# Patient Record
Sex: MALE | NOT HISPANIC OR LATINO | URBAN - METROPOLITAN AREA
[De-identification: names, ages, dates, MRNs, and addresses within clinical notes are randomized per-mention and may not be internally consistent; named-entity substitution may affect disease eponyms.]

---

## 2017-07-09 ENCOUNTER — EMERGENCY (EMERGENCY)
Facility: HOSPITAL | Age: 52
LOS: 1 days | Discharge: HOME | End: 2017-07-09
Attending: EMERGENCY MEDICINE | Admitting: EMERGENCY MEDICINE
Payer: COMMERCIAL

## 2017-07-09 VITALS
DIASTOLIC BLOOD PRESSURE: 73 MMHG | SYSTOLIC BLOOD PRESSURE: 112 MMHG | HEART RATE: 121 BPM | RESPIRATION RATE: 16 BRPM | OXYGEN SATURATION: 95 % | HEIGHT: 73 IN | TEMPERATURE: 99 F | WEIGHT: 164.91 LBS

## 2017-07-09 VITALS
HEART RATE: 89 BPM | SYSTOLIC BLOOD PRESSURE: 100 MMHG | OXYGEN SATURATION: 94 % | DIASTOLIC BLOOD PRESSURE: 66 MMHG | RESPIRATION RATE: 16 BRPM

## 2017-07-09 DIAGNOSIS — R41.82 ALTERED MENTAL STATUS, UNSPECIFIED: ICD-10-CM

## 2017-07-09 DIAGNOSIS — F10.129 ALCOHOL ABUSE WITH INTOXICATION, UNSPECIFIED: ICD-10-CM

## 2017-07-09 DIAGNOSIS — R00.0 TACHYCARDIA, UNSPECIFIED: ICD-10-CM

## 2017-07-09 LAB
ALBUMIN SERPL ELPH-MCNC: 3.9 G/DL — SIGNIFICANT CHANGE UP (ref 3.3–5)
ALP SERPL-CCNC: 71 U/L — SIGNIFICANT CHANGE UP (ref 40–120)
ALT FLD-CCNC: 19 U/L — SIGNIFICANT CHANGE UP (ref 10–45)
ANION GAP SERPL CALC-SCNC: 22 MMOL/L — HIGH (ref 5–17)
AST SERPL-CCNC: 31 U/L — SIGNIFICANT CHANGE UP (ref 10–40)
BASOPHILS NFR BLD AUTO: 0.4 % — SIGNIFICANT CHANGE UP (ref 0–2)
BILIRUB SERPL-MCNC: 0.6 MG/DL — SIGNIFICANT CHANGE UP (ref 0.2–1.2)
BUN SERPL-MCNC: 12 MG/DL — SIGNIFICANT CHANGE UP (ref 7–23)
CALCIUM SERPL-MCNC: 8.4 MG/DL — SIGNIFICANT CHANGE UP (ref 8.4–10.5)
CHLORIDE SERPL-SCNC: 97 MMOL/L — SIGNIFICANT CHANGE UP (ref 96–108)
CO2 SERPL-SCNC: 24 MMOL/L — SIGNIFICANT CHANGE UP (ref 22–31)
CREAT SERPL-MCNC: 0.7 MG/DL — SIGNIFICANT CHANGE UP (ref 0.5–1.3)
EOSINOPHIL NFR BLD AUTO: 1.7 % — SIGNIFICANT CHANGE UP (ref 0–6)
ETHANOL SERPL-MCNC: 475 MG/DL — HIGH (ref 0–10)
GLUCOSE SERPL-MCNC: 91 MG/DL — SIGNIFICANT CHANGE UP (ref 70–99)
HCT VFR BLD CALC: 38.6 % — LOW (ref 39–50)
HGB BLD-MCNC: 13.4 G/DL — SIGNIFICANT CHANGE UP (ref 13–17)
LYMPHOCYTES # BLD AUTO: 25.9 % — SIGNIFICANT CHANGE UP (ref 13–44)
MAGNESIUM SERPL-MCNC: 1.8 MG/DL — SIGNIFICANT CHANGE UP (ref 1.6–2.6)
MCHC RBC-ENTMCNC: 32.6 PG — SIGNIFICANT CHANGE UP (ref 27–34)
MCHC RBC-ENTMCNC: 34.7 G/DL — SIGNIFICANT CHANGE UP (ref 32–36)
MCV RBC AUTO: 93.9 FL — SIGNIFICANT CHANGE UP (ref 80–100)
MONOCYTES NFR BLD AUTO: 5.3 % — SIGNIFICANT CHANGE UP (ref 2–14)
NEUTROPHILS NFR BLD AUTO: 66.7 % — SIGNIFICANT CHANGE UP (ref 43–77)
PHOSPHATE SERPL-MCNC: 3.5 MG/DL — SIGNIFICANT CHANGE UP (ref 2.5–4.5)
PLATELET # BLD AUTO: 129 K/UL — LOW (ref 150–400)
POTASSIUM SERPL-MCNC: 4.1 MMOL/L — SIGNIFICANT CHANGE UP (ref 3.5–5.3)
POTASSIUM SERPL-SCNC: 4.1 MMOL/L — SIGNIFICANT CHANGE UP (ref 3.5–5.3)
PROT SERPL-MCNC: 6.7 G/DL — SIGNIFICANT CHANGE UP (ref 6–8.3)
RBC # BLD: 4.11 M/UL — LOW (ref 4.2–5.8)
RBC # FLD: 14.3 % — SIGNIFICANT CHANGE UP (ref 10.3–16.9)
SODIUM SERPL-SCNC: 143 MMOL/L — SIGNIFICANT CHANGE UP (ref 135–145)
WBC # BLD: 9.4 K/UL — SIGNIFICANT CHANGE UP (ref 3.8–10.5)
WBC # FLD AUTO: 9.4 K/UL — SIGNIFICANT CHANGE UP (ref 3.8–10.5)

## 2017-07-09 PROCEDURE — 84100 ASSAY OF PHOSPHORUS: CPT

## 2017-07-09 PROCEDURE — 85025 COMPLETE CBC W/AUTO DIFF WBC: CPT

## 2017-07-09 PROCEDURE — 93005 ELECTROCARDIOGRAM TRACING: CPT

## 2017-07-09 PROCEDURE — 80053 COMPREHEN METABOLIC PANEL: CPT

## 2017-07-09 PROCEDURE — 99285 EMERGENCY DEPT VISIT HI MDM: CPT | Mod: 25

## 2017-07-09 PROCEDURE — 83735 ASSAY OF MAGNESIUM: CPT

## 2017-07-09 PROCEDURE — 96374 THER/PROPH/DIAG INJ IV PUSH: CPT

## 2017-07-09 PROCEDURE — 93010 ELECTROCARDIOGRAM REPORT: CPT | Mod: 59

## 2017-07-09 PROCEDURE — 80307 DRUG TEST PRSMV CHEM ANLYZR: CPT

## 2017-07-09 PROCEDURE — 99284 EMERGENCY DEPT VISIT MOD MDM: CPT | Mod: 25

## 2017-07-09 RX ORDER — SODIUM CHLORIDE 9 MG/ML
1000 INJECTION, SOLUTION INTRAVENOUS
Qty: 0 | Refills: 0 | Status: DISCONTINUED | OUTPATIENT
Start: 2017-07-09 | End: 2017-07-13

## 2017-07-09 RX ADMIN — Medication 25 MILLIGRAM(S): at 18:55

## 2017-07-09 RX ADMIN — SODIUM CHLORIDE 200 MILLILITER(S): 9 INJECTION, SOLUTION INTRAVENOUS at 15:34

## 2017-07-09 RX ADMIN — SODIUM CHLORIDE 200 MILLILITER(S): 9 INJECTION, SOLUTION INTRAVENOUS at 16:09

## 2017-07-09 NOTE — ED PROVIDER NOTE - OBJECTIVE STATEMENT
brought in by ems intoxicated.  Admits to drinking.  Accompanied by spouse who states he was discharged from 30 day detox in [place] recently and has been drinking daily for more than a week.  History of alcohol withdrawal symptoms but never had a seizure before.  Denies trauma.  Unable to provide further coherent history

## 2017-07-09 NOTE — ED PROVIDER NOTE - ATTENDING CONTRIBUTION TO CARE
recent admit at another hospital for gi bleed requiring transfusion.  returns with persistent black stool, increasing fatigue.  concern for continued upper gi bleed.  started protonix.  gi consulted.  blood transfusion for low hgb.  icu consult for possible monitored bed admission.  admit for further treatment

## 2017-07-09 NOTE — ED PROVIDER NOTE - PHYSICAL EXAMINATION
· CONSTITUTIONAL: smells of alcohol, well nourished, awake, alert, oriented to person,  and in no apparent distress.  · ENMT: Airway patent, Nasal mucosa clear. Mouth with normal mucosa.  · HEAD: Head is atraumatic. Head shape is symmetrical.  · EYES: Clear bilaterally, pupils equal, round and reactive to light.  · CARDIAC: borderline tachy rate, regular rhythm.  Heart sounds S1, S2.  · RESPIRATORY: Breath sounds clear and equal bilaterally.  · GASTROINTESTINAL: Abdomen soft, no guarding.  · MUSCULOSKELETAL: Spine appears normal, range of motion is not limited  · NEUROLOGICAL: Alert and oriented to self, speech slurred, not following formal neuro exam  · SKIN: Skin normal color for race, warm, dry and intact. No evidence of rash.  · PSYCHIATRIC: Alert and oriented to person. intoxicated affect. no apparent risk to self or others.  · HEME LYMPH: No adenopathy

## 2017-07-09 NOTE — ED PROVIDER NOTE - PROGRESS NOTE DETAILS
alert and oriented x3 with steady gait, requesting discharge.  spouse at bedside.  understands risk of alcohol withdrawal.   mario spoke to them and gave referrals for detox.  will give librium rx for next 2 days until can make detox arrangements.  to return to er if symptoms of withdrawal develop

## 2017-07-09 NOTE — ED ADULT TRIAGE NOTE - CHIEF COMPLAINT QUOTE
Pt's spouse states "he was in rehab until 6/28, then he was doing ok, but he was drinking a lot of vodka today."

## 2018-01-22 PROBLEM — Z00.00 ENCOUNTER FOR PREVENTIVE HEALTH EXAMINATION: Status: ACTIVE | Noted: 2018-01-22

## 2018-01-29 ENCOUNTER — APPOINTMENT (OUTPATIENT)
Dept: SURGERY | Facility: CLINIC | Age: 53
End: 2018-01-29
Payer: COMMERCIAL

## 2018-01-29 VITALS
OXYGEN SATURATION: 98 % | HEART RATE: 76 BPM | DIASTOLIC BLOOD PRESSURE: 71 MMHG | SYSTOLIC BLOOD PRESSURE: 105 MMHG | WEIGHT: 170 LBS | TEMPERATURE: 97.8 F | HEIGHT: 73 IN | BODY MASS INDEX: 22.53 KG/M2

## 2018-01-29 PROCEDURE — 99203 OFFICE O/P NEW LOW 30 MIN: CPT

## 2018-01-29 RX ORDER — NEOMYCIN SULFATE 500 MG/1
500 TABLET ORAL
Qty: 6 | Refills: 0 | Status: ACTIVE | COMMUNITY
Start: 2018-01-29 | End: 1900-01-01

## 2018-01-29 RX ORDER — POLYETHYLENE GLYCOL 3350, SODIUM SULFATE ANHYDROUS, SODIUM BICARBONATE, SODIUM CHLORIDE, POTASSIUM CHLORIDE 227.1; 21.5; 6.36; 5.53; .754 G/L; G/L; G/L; G/L; G/L
227.1 POWDER, FOR SOLUTION ORAL
Refills: 0 | Status: ACTIVE | COMMUNITY
Start: 2018-01-29 | End: 1900-01-01

## 2018-01-29 RX ORDER — ERYTHROMYCIN 500 MG/1
500 TABLET, FILM COATED ORAL
Qty: 6 | Refills: 0 | Status: ACTIVE | COMMUNITY
Start: 2018-01-29 | End: 1900-01-01

## 2018-02-09 ENCOUNTER — OUTPATIENT (OUTPATIENT)
Dept: OUTPATIENT SERVICES | Facility: HOSPITAL | Age: 53
LOS: 1 days | End: 2018-02-09
Payer: COMMERCIAL

## 2018-02-09 VITALS
WEIGHT: 169.98 LBS | DIASTOLIC BLOOD PRESSURE: 73 MMHG | TEMPERATURE: 98 F | OXYGEN SATURATION: 97 % | SYSTOLIC BLOOD PRESSURE: 117 MMHG | RESPIRATION RATE: 16 BRPM | HEIGHT: 74 IN | HEART RATE: 60 BPM

## 2018-02-09 DIAGNOSIS — Z01.818 ENCOUNTER FOR OTHER PREPROCEDURAL EXAMINATION: ICD-10-CM

## 2018-02-09 DIAGNOSIS — C18.7 MALIGNANT NEOPLASM OF SIGMOID COLON: ICD-10-CM

## 2018-02-09 LAB — HBA1C BLD-MCNC: 5.8 % — HIGH (ref 4–5.6)

## 2018-02-09 PROCEDURE — 86850 RBC ANTIBODY SCREEN: CPT

## 2018-02-09 PROCEDURE — 83036 HEMOGLOBIN GLYCOSYLATED A1C: CPT

## 2018-02-09 PROCEDURE — G0463: CPT

## 2018-02-09 PROCEDURE — 86900 BLOOD TYPING SEROLOGIC ABO: CPT

## 2018-02-09 PROCEDURE — 36415 COLL VENOUS BLD VENIPUNCTURE: CPT

## 2018-02-09 PROCEDURE — 86901 BLOOD TYPING SEROLOGIC RH(D): CPT

## 2018-02-09 RX ORDER — SODIUM CHLORIDE 9 MG/ML
3 INJECTION INTRAMUSCULAR; INTRAVENOUS; SUBCUTANEOUS EVERY 8 HOURS
Qty: 0 | Refills: 0 | Status: DISCONTINUED | OUTPATIENT
Start: 2018-02-14 | End: 2018-02-17

## 2018-02-09 NOTE — H&P PST ADULT - GASTROINTESTINAL DETAILS
no distention/no rebound tenderness/no rigidity/soft/no organomegaly/no bruit/no guarding/bowel sounds normal/nontender/no masses palpable

## 2018-02-09 NOTE — H&P PST ADULT - RS GEN PE MLT RESP DETAILS PC
no rales/respirations non-labored/clear to auscultation bilaterally/normal/airway patent/no wheezes/no rhonchi/no subcutaneous emphysema/no chest wall tenderness/no intercostal retractions/good air movement/breath sounds equal

## 2018-02-09 NOTE — H&P PST ADULT - ASSESSMENT
53 y/o male with PMH of GERD (not on medication) associated, hiatal hernia diagnosed with malignant neoplasm of sigmoid colon scheduled for laparoscopic assisted sigmoid colon resection on 2/14/2018. Patient is at low risk for planned procedure

## 2018-02-09 NOTE — H&P PST ADULT - PROBLEM SELECTOR PLAN 1
Scheduled for laparoscopic assisted sigmoid colon resection on 2/14/2018. Preoperative instructions discussed and given to patient. Verbalized understanding of instructions

## 2018-02-09 NOTE — H&P PST ADULT - NEGATIVE GENERAL SYMPTOMS
no weight gain/no polyuria/no polydipsia/no malaise/no chills/no sweating/no anorexia/no weight loss/no polyphagia/no fatigue/no fever

## 2018-02-09 NOTE — H&P PST ADULT - NSANTHOSAYNRD_GEN_A_CORE
No. ELIZ screening performed.  STOP BANG Legend: 0-2 = LOW Risk; 3-4 = INTERMEDIATE Risk; 5-8 = HIGH Risk

## 2018-02-09 NOTE — H&P PST ADULT - HISTORY OF PRESENT ILLNESS
GERD associated, hiatal hernia  revealed on routine colonoscopy 12/2017 53 y/o male with PMH of GERD associated, hiatal hernia presents to [facility] for presurgical evaluation. He was diagnosed with malignant neoplasm of sigmoid colon found on routine colonoscopy and is scheduled for laparoscopic assisted sigmoid colon resection on 2/14/2018

## 2018-02-11 ENCOUNTER — TRANSCRIPTION ENCOUNTER (OUTPATIENT)
Age: 53
End: 2018-02-11

## 2018-02-13 ENCOUNTER — TRANSCRIPTION ENCOUNTER (OUTPATIENT)
Age: 53
End: 2018-02-13

## 2018-02-14 ENCOUNTER — INPATIENT (INPATIENT)
Facility: HOSPITAL | Age: 53
LOS: 2 days | Discharge: HOME | DRG: 828 | End: 2018-02-17
Attending: SPECIALIST | Admitting: SPECIALIST
Payer: COMMERCIAL

## 2018-02-14 ENCOUNTER — RESULT REVIEW (OUTPATIENT)
Age: 53
End: 2018-02-14

## 2018-02-14 VITALS
RESPIRATION RATE: 18 BRPM | HEIGHT: 74 IN | SYSTOLIC BLOOD PRESSURE: 99 MMHG | WEIGHT: 169.98 LBS | HEART RATE: 99 BPM | TEMPERATURE: 97 F | DIASTOLIC BLOOD PRESSURE: 64 MMHG

## 2018-02-14 DIAGNOSIS — C18.7 MALIGNANT NEOPLASM OF SIGMOID COLON: ICD-10-CM

## 2018-02-14 LAB
GLUCOSE BLDC GLUCOMTR-MCNC: 110 MG/DL — HIGH (ref 70–99)
GLUCOSE BLDC GLUCOMTR-MCNC: 119 MG/DL — HIGH (ref 70–99)

## 2018-02-14 PROCEDURE — 88309 TISSUE EXAM BY PATHOLOGIST: CPT | Mod: 26

## 2018-02-14 PROCEDURE — 88304 TISSUE EXAM BY PATHOLOGIST: CPT | Mod: 26

## 2018-02-14 PROCEDURE — 44204 LAPARO PARTIAL COLECTOMY: CPT | Mod: AS

## 2018-02-14 PROCEDURE — 44204 LAPARO PARTIAL COLECTOMY: CPT

## 2018-02-14 RX ORDER — SODIUM CHLORIDE 9 MG/ML
1000 INJECTION INTRAMUSCULAR; INTRAVENOUS; SUBCUTANEOUS
Qty: 0 | Refills: 0 | Status: DISCONTINUED | OUTPATIENT
Start: 2018-02-14 | End: 2018-02-17

## 2018-02-14 RX ORDER — HYDROMORPHONE HYDROCHLORIDE 2 MG/ML
30 INJECTION INTRAMUSCULAR; INTRAVENOUS; SUBCUTANEOUS
Qty: 0 | Refills: 0 | Status: DISCONTINUED | OUTPATIENT
Start: 2018-02-14 | End: 2018-02-16

## 2018-02-14 RX ORDER — DEXTROSE MONOHYDRATE, SODIUM CHLORIDE, AND POTASSIUM CHLORIDE 50; .745; 4.5 G/1000ML; G/1000ML; G/1000ML
1000 INJECTION, SOLUTION INTRAVENOUS
Qty: 0 | Refills: 0 | Status: DISCONTINUED | OUTPATIENT
Start: 2018-02-14 | End: 2018-02-16

## 2018-02-14 RX ORDER — NALOXONE HYDROCHLORIDE 4 MG/.1ML
0.1 SPRAY NASAL
Qty: 0 | Refills: 0 | Status: DISCONTINUED | OUTPATIENT
Start: 2018-02-14 | End: 2018-02-16

## 2018-02-14 RX ORDER — ONDANSETRON 8 MG/1
4 TABLET, FILM COATED ORAL EVERY 6 HOURS
Qty: 0 | Refills: 0 | Status: DISCONTINUED | OUTPATIENT
Start: 2018-02-14 | End: 2018-02-16

## 2018-02-14 RX ORDER — HYDROMORPHONE HYDROCHLORIDE 2 MG/ML
0.5 INJECTION INTRAMUSCULAR; INTRAVENOUS; SUBCUTANEOUS ONCE
Qty: 0 | Refills: 0 | Status: DISCONTINUED | OUTPATIENT
Start: 2018-02-14 | End: 2018-02-14

## 2018-02-14 RX ORDER — HEPARIN SODIUM 5000 [USP'U]/ML
5000 INJECTION INTRAVENOUS; SUBCUTANEOUS EVERY 8 HOURS
Qty: 0 | Refills: 0 | Status: DISCONTINUED | OUTPATIENT
Start: 2018-02-14 | End: 2018-02-17

## 2018-02-14 RX ORDER — ALVIMOPAN 12 MG/1
12 CAPSULE ORAL ONCE
Qty: 0 | Refills: 0 | Status: COMPLETED | OUTPATIENT
Start: 2018-02-14 | End: 2018-02-14

## 2018-02-14 RX ADMIN — HYDROMORPHONE HYDROCHLORIDE 30 MILLILITER(S): 2 INJECTION INTRAMUSCULAR; INTRAVENOUS; SUBCUTANEOUS at 11:08

## 2018-02-14 RX ADMIN — ALVIMOPAN 12 MILLIGRAM(S): 12 CAPSULE ORAL at 07:58

## 2018-02-14 RX ADMIN — HEPARIN SODIUM 5000 UNIT(S): 5000 INJECTION INTRAVENOUS; SUBCUTANEOUS at 17:51

## 2018-02-14 RX ADMIN — HEPARIN SODIUM 5000 UNIT(S): 5000 INJECTION INTRAVENOUS; SUBCUTANEOUS at 22:18

## 2018-02-14 RX ADMIN — DEXTROSE MONOHYDRATE, SODIUM CHLORIDE, AND POTASSIUM CHLORIDE 125 MILLILITER(S): 50; .745; 4.5 INJECTION, SOLUTION INTRAVENOUS at 22:18

## 2018-02-14 RX ADMIN — HYDROMORPHONE HYDROCHLORIDE 30 MILLILITER(S): 2 INJECTION INTRAMUSCULAR; INTRAVENOUS; SUBCUTANEOUS at 15:22

## 2018-02-14 RX ADMIN — SODIUM CHLORIDE 500 MILLILITER(S): 9 INJECTION INTRAMUSCULAR; INTRAVENOUS; SUBCUTANEOUS at 14:20

## 2018-02-14 RX ADMIN — HYDROMORPHONE HYDROCHLORIDE 30 MILLILITER(S): 2 INJECTION INTRAMUSCULAR; INTRAVENOUS; SUBCUTANEOUS at 19:45

## 2018-02-14 RX ADMIN — SODIUM CHLORIDE 3 MILLILITER(S): 9 INJECTION INTRAMUSCULAR; INTRAVENOUS; SUBCUTANEOUS at 07:58

## 2018-02-14 RX ADMIN — SODIUM CHLORIDE 3 MILLILITER(S): 9 INJECTION INTRAMUSCULAR; INTRAVENOUS; SUBCUTANEOUS at 22:18

## 2018-02-14 NOTE — PROGRESS NOTE ADULT - SUBJECTIVE AND OBJECTIVE BOX
post op lap sigmoid resection  no c/o cp, no sob, pain controlled well with pca  vss a/f  Abd: soft nt nd wound mango in place cdi  Lungs: cta  Ext: no cce

## 2018-02-14 NOTE — PROGRESS NOTE ADULT - ASSESSMENT
post op lap sigmoid resection  post op stable  oob in am  d/c knight in am  check am labs  insentive spirometry

## 2018-02-14 NOTE — PROGRESS NOTE ADULT - SUBJECTIVE AND OBJECTIVE BOX
POSTOP NOTE  Patient is doing okay.        Vital Signs Last 24 Hrs  T(C): 36.8 (14 Feb 2018 15:29), Max: 37.2 (14 Feb 2018 14:28)  T(F): 98.3 (14 Feb 2018 15:29), Max: 98.9 (14 Feb 2018 14:28)  HR: 62 (14 Feb 2018 15:29) (62 - 99)  BP: 111/64 (14 Feb 2018 15:29) (90/55 - 113/46)  BP(mean): 68 (14 Feb 2018 14:28) (60 - 68)  RR: 17 (14 Feb 2018 15:29) (12 - 20)  SpO2: 100% (14 Feb 2018 15:29) (96% - 100%)    Daily Height in cm: 187.96 (14 Feb 2018 07:53)    Daily     I&O's Detail    14 Feb 2018 07:01  -  14 Feb 2018 21:24  --------------------------------------------------------  IN:    Lactated Ringers IV Bolus: 2400 mL  Total IN: 2400 mL    OUT:    Estimated Blood Loss: 50 mL    Indwelling Catheter - Urethral: 350 mL  Total OUT: 400 mL    Total NET: 2000 mL          MEDICATIONS  (STANDING):  heparin  Injectable 5000 Unit(s) SubCutaneous every 8 hours  HYDROmorphone PCA (1 mG/mL) 30 milliLiter(s) PCA Continuous PCA Continuous  sodium chloride 0.9% lock flush 3 milliLiter(s) IV Push every 8 hours  sodium chloride 0.9% with potassium chloride 20 mEq/L 1000 milliLiter(s) (125 mL/Hr) IV Continuous <Continuous>  sodium chloride 0.9%. 1000 milliLiter(s) (500 mL/Hr) IV Continuous <Continuous>    MEDICATIONS  (PRN):  naloxone Injectable 0.1 milliGRAM(s) IV Push every 3 minutes PRN For ANY of the following changes in patient status:  A. RR LESS THAN 10 breaths per minute, B. Oxygen saturation LESS THAN 90%, C. Sedation score of 6  ondansetron Injectable 4 milliGRAM(s) IV Push every 6 hours PRN Nausea      PHYSICAL EXAM:  GENERAL: In no acute distress  CHEST/LUNG: Clear to percussion bilaterally; Normal respiratory effort  HEART: Regular rate and rhythm  ABDOMEN: Soft, Dressing c/d/i, Nondistended; Bowel sounds present  EXTREMITIES:  No clubbing, cyanosis, or edema    LABS:    Neutrophil %:               RADIOLOGY & ADDITIONAL STUDIES:

## 2018-02-14 NOTE — BRIEF OPERATIVE NOTE - PROCEDURE
<<-----Click on this checkbox to enter Procedure Sigmoid resection  02/14/2018  lap assisted  Active  RLIND

## 2018-02-15 DIAGNOSIS — F10.10 ALCOHOL ABUSE, UNCOMPLICATED: ICD-10-CM

## 2018-02-15 DIAGNOSIS — G89.18 OTHER ACUTE POSTPROCEDURAL PAIN: ICD-10-CM

## 2018-02-15 DIAGNOSIS — Z90.49 ACQUIRED ABSENCE OF OTHER SPECIFIED PARTS OF DIGESTIVE TRACT: ICD-10-CM

## 2018-02-15 LAB
ANION GAP SERPL CALC-SCNC: 6 MMOL/L — SIGNIFICANT CHANGE UP (ref 5–17)
BUN SERPL-MCNC: 11 MG/DL — SIGNIFICANT CHANGE UP (ref 7–18)
CALCIUM SERPL-MCNC: 8 MG/DL — LOW (ref 8.4–10.5)
CHLORIDE SERPL-SCNC: 109 MMOL/L — HIGH (ref 96–108)
CO2 SERPL-SCNC: 26 MMOL/L — SIGNIFICANT CHANGE UP (ref 22–31)
CREAT SERPL-MCNC: 0.75 MG/DL — SIGNIFICANT CHANGE UP (ref 0.5–1.3)
GLUCOSE SERPL-MCNC: 135 MG/DL — HIGH (ref 70–99)
HCT VFR BLD CALC: 33.3 % — LOW (ref 39–50)
HGB BLD-MCNC: 11.1 G/DL — LOW (ref 13–17)
MCHC RBC-ENTMCNC: 32 PG — SIGNIFICANT CHANGE UP (ref 27–34)
MCHC RBC-ENTMCNC: 33.3 GM/DL — SIGNIFICANT CHANGE UP (ref 32–36)
MCV RBC AUTO: 95.8 FL — SIGNIFICANT CHANGE UP (ref 80–100)
PLATELET # BLD AUTO: 143 K/UL — LOW (ref 150–400)
POTASSIUM SERPL-MCNC: 4 MMOL/L — SIGNIFICANT CHANGE UP (ref 3.5–5.3)
POTASSIUM SERPL-SCNC: 4 MMOL/L — SIGNIFICANT CHANGE UP (ref 3.5–5.3)
RBC # BLD: 3.47 M/UL — LOW (ref 4.2–5.8)
RBC # FLD: 12.8 % — SIGNIFICANT CHANGE UP (ref 10.3–14.5)
SODIUM SERPL-SCNC: 141 MMOL/L — SIGNIFICANT CHANGE UP (ref 135–145)
WBC # BLD: 11.4 K/UL — HIGH (ref 3.8–10.5)
WBC # FLD AUTO: 11.4 K/UL — HIGH (ref 3.8–10.5)

## 2018-02-15 PROCEDURE — 99223 1ST HOSP IP/OBS HIGH 75: CPT

## 2018-02-15 RX ADMIN — HEPARIN SODIUM 5000 UNIT(S): 5000 INJECTION INTRAVENOUS; SUBCUTANEOUS at 21:15

## 2018-02-15 RX ADMIN — SODIUM CHLORIDE 3 MILLILITER(S): 9 INJECTION INTRAMUSCULAR; INTRAVENOUS; SUBCUTANEOUS at 14:44

## 2018-02-15 RX ADMIN — SODIUM CHLORIDE 3 MILLILITER(S): 9 INJECTION INTRAMUSCULAR; INTRAVENOUS; SUBCUTANEOUS at 05:34

## 2018-02-15 RX ADMIN — SODIUM CHLORIDE 3 MILLILITER(S): 9 INJECTION INTRAMUSCULAR; INTRAVENOUS; SUBCUTANEOUS at 21:12

## 2018-02-15 RX ADMIN — HYDROMORPHONE HYDROCHLORIDE 30 MILLILITER(S): 2 INJECTION INTRAMUSCULAR; INTRAVENOUS; SUBCUTANEOUS at 07:50

## 2018-02-15 RX ADMIN — HEPARIN SODIUM 5000 UNIT(S): 5000 INJECTION INTRAVENOUS; SUBCUTANEOUS at 05:34

## 2018-02-15 RX ADMIN — DEXTROSE MONOHYDRATE, SODIUM CHLORIDE, AND POTASSIUM CHLORIDE 125 MILLILITER(S): 50; .745; 4.5 INJECTION, SOLUTION INTRAVENOUS at 17:30

## 2018-02-15 RX ADMIN — HEPARIN SODIUM 5000 UNIT(S): 5000 INJECTION INTRAVENOUS; SUBCUTANEOUS at 13:35

## 2018-02-15 RX ADMIN — HYDROMORPHONE HYDROCHLORIDE 30 MILLILITER(S): 2 INJECTION INTRAMUSCULAR; INTRAVENOUS; SUBCUTANEOUS at 19:06

## 2018-02-15 NOTE — CONSULT NOTE ADULT - PROBLEM SELECTOR RECOMMENDATION 9
incentive spirometry  pain control  DVT PPX  surgical follow up
- continue pca  - pca hydromorphone teaching done  - iv acetaminophen prn  - oob

## 2018-02-15 NOTE — PROGRESS NOTE ADULT - SUBJECTIVE AND OBJECTIVE BOX
INTERVAL HPI/OVERNIGHT EVENTS:  Pt resting comfortably. No acute complaints.   NPO.  -flatus/BM.   Denies N/V    MEDICATIONS  (STANDING):  heparin  Injectable 5000 Unit(s) SubCutaneous every 8 hours  HYDROmorphone PCA (1 mG/mL) 30 milliLiter(s) PCA Continuous PCA Continuous  sodium chloride 0.9% lock flush 3 milliLiter(s) IV Push every 8 hours  sodium chloride 0.9% with potassium chloride 20 mEq/L 1000 milliLiter(s) (125 mL/Hr) IV Continuous <Continuous>  sodium chloride 0.9%. 1000 milliLiter(s) (500 mL/Hr) IV Continuous <Continuous>    MEDICATIONS  (PRN):  naloxone Injectable 0.1 milliGRAM(s) IV Push every 3 minutes PRN For ANY of the following changes in patient status:  A. RR LESS THAN 10 breaths per minute, B. Oxygen saturation LESS THAN 90%, C. Sedation score of 6  ondansetron Injectable 4 milliGRAM(s) IV Push every 6 hours PRN Nausea      Vital Signs Last 24 Hrs  T(C): 36.8 (15 Feb 2018 05:00), Max: 37.2 (14 Feb 2018 14:28)  T(F): 98.3 (15 Feb 2018 05:00), Max: 98.9 (14 Feb 2018 14:28)  HR: 60 (15 Feb 2018 05:00) (59 - 79)  BP: 95/47 (15 Feb 2018 05:00) (90/55 - 113/46)  BP(mean): 68 (14 Feb 2018 14:28) (60 - 68)  RR: 18 (15 Feb 2018 05:00) (12 - 20)  SpO2: 100% (15 Feb 2018 05:00) (96% - 100%)    Physical:  General: A&Ox3. NAD.  Abdomen: Soft nondistended, CAL dressing place with good suction.    I&O's Detail    14 Feb 2018 07:01  -  15 Feb 2018 07:00  --------------------------------------------------------  IN:    Lactated Ringers IV Bolus: 2400 mL  Total IN: 2400 mL    OUT:    Estimated Blood Loss: 50 mL    Indwelling Catheter - Urethral: 1200 mL clear  Total OUT: 1250 mL    Total NET: 1150 mL    LABS:                        11.1   11.4  )-----------( 143      ( 15 Feb 2018 07:05 )             33.3             02-15    141  |  109<H>  |  11  ----------------------------<  135<H>  4.0   |  26  |  0.75    Ca    8.0<L>      15 Feb 2018 07:05

## 2018-02-15 NOTE — CONSULT NOTE ADULT - SUBJECTIVE AND OBJECTIVE BOX
Patient is a 52y old  Male who presents with a chief complaint of Colon Surgery (14 Feb 2018 07:35)    History of Present Illness:  History of Present Illness		  53 y/o male with PMH of GERD associated, hiatal hernia presents to [facility] for presurgical evaluation. He was diagnosed with malignant neoplasm of sigmoid colon found on routine colonoscopy and was scheduled for laparoscopic assisted sigmoid colon resection on 2/14/2018  Awake, alert, comfortable in bed in [facility].    INTERVAL HPI/OVERNIGHT EVENTS:  T(C): 36.8 (02-15-18 @ 09:20), Max: 37.2 (02-14-18 @ 14:28)  HR: 55 (02-15-18 @ 09:20) (55 - 76)  BP: 105/60 (02-15-18 @ 09:20) (94/54 - 111/64)  RR: 18 (02-15-18 @ 09:20) (12 - 19)  SpO2: 94% (02-15-18 @ 09:20) (94% - 100%)  Wt(kg): --  I&O's Summary    14 Feb 2018 07:01  -  15 Feb 2018 07:00  --------------------------------------------------------  IN: 2400 mL / OUT: 1250 mL / NET: 1150 mL    15 Feb 2018 07:01  -  15 Feb 2018 13:02  --------------------------------------------------------  IN: 0 mL / OUT: 680 mL / NET: -680 mL        PAST MEDICAL & SURGICAL HISTORY:  Alcohol abuse: in remission since 2017  No significant past surgical history      SOCIAL HISTORY  Alcohol:  Tobacco:  Illicit substance use:      FAMILY HISTORY:      LABS:                        11.1   11.4  )-----------( 143      ( 15 Feb 2018 07:05 )             33.3     02-15    141  |  109<H>  |  11  ----------------------------<  135<H>  4.0   |  26  |  0.75    Ca    8.0<L>      15 Feb 2018 07:05          CAPILLARY BLOOD GLUCOSE                MEDICATIONS  (STANDING):  heparin  Injectable 5000 Unit(s) SubCutaneous every 8 hours  HYDROmorphone PCA (1 mG/mL) 30 milliLiter(s) PCA Continuous PCA Continuous  sodium chloride 0.9% lock flush 3 milliLiter(s) IV Push every 8 hours  sodium chloride 0.9% with potassium chloride 20 mEq/L 1000 milliLiter(s) (125 mL/Hr) IV Continuous <Continuous>  sodium chloride 0.9%. 1000 milliLiter(s) (500 mL/Hr) IV Continuous <Continuous>    MEDICATIONS  (PRN):  naloxone Injectable 0.1 milliGRAM(s) IV Push every 3 minutes PRN For ANY of the following changes in patient status:  A. RR LESS THAN 10 breaths per minute, B. Oxygen saturation LESS THAN 90%, C. Sedation score of 6  ondansetron Injectable 4 milliGRAM(s) IV Push every 6 hours PRN Nausea      REVIEW OF SYSTEMS:  CONSTITUTIONAL: No fever, weight loss, or fatigue  EYES: No eye pain, visual disturbances, or discharge  ENMT:  No difficulty hearing, tinnitus, vertigo; No sinus or throat pain  NECK: No pain or stiffness  RESPIRATORY: No cough, wheezing, chills or hemoptysis; No shortness of breath  CARDIOVASCULAR: No chest pain, palpitations, dizziness, or leg swelling  GASTROINTESTINAL: + abdominal  pain. No nausea, vomiting, or hematemesis; No diarrhea or constipation. No melena or hematochezia.  GENITOURINARY: No dysuria, frequency, hematuria, or incontinence  NEUROLOGICAL: No headaches, memory loss, loss of strength, numbness, or tremors  SKIN: No itching, burning, rashes, or lesions   LYMPH NODES: No enlarged glands  ENDOCRINE: No heat or cold intolerance; No hair loss  MUSCULOSKELETAL: No joint pain or swelling; No muscle, back, or extremity pain  PSYCHIATRIC: No depression, anxiety, mood swings, or difficulty sleeping  HEME/LYMPH: No easy bruising, or bleeding gums  ALLERY AND IMMUNOLOGIC: No hives or eczema    PHYSICAL EXAM:  GENERAL: NAD, well-groomed, well-developed  HEAD:  Atraumatic, Normocephalic  EYES: EOMI, PERRLA, conjunctiva and sclera clear  ENMT: No tonsillar erythema, exudates, or enlargement; Moist mucous membranes, Good dentition, No lesions  NECK: Supple, No JVD, Normal thyroid  NERVOUS SYSTEM:  Alert & Oriented X3, Good concentration; Motor Strength 5/5 B/L upper and lower extremities; DTRs 2+ intact and symmetric  CHEST/LUNG: Clear to percussion bilaterally; No rales, rhonchi, wheezing, or rubs  HEART: Regular rate and rhythm; No murmurs, rubs, or gallops  ABDOMEN: Soft, incisional tenderness  EXTREMITIES:  2+ Peripheral Pulses, No clubbing, cyanosis, or edema  LYMPH: No lymphadenopathy noted  SKIN: No rashes or lesions    RADIOLOGY & ADDITIONAL TESTS:    Imaging Personally Reviewed:  [x ] YES  [ ] NO    Consultant(s) Notes Reviewed:  [ x] YES  [ ] NO        Care Discussed with Consultants/Other Providers [x ] YES  [ ] NO
HPI:  53 y/o male with PMH of GERD associated, hiatal hernia presents to [facility] for presurgical evaluation. He was diagnosed with malignant neoplasm of sigmoid colon found on routine colonoscopy and is scheduled for laparoscopic assisted sigmoid colon resection on 2/14/2018 (09 Feb 2018 11:18)      2/15/18 - 52 year old male s/p lap assisted sigmoid colon resection, pod#1.  Pt complaining of abdominal pain which worsens with exertion.  No nausea or vomiting. Pt on pca hydromorphone - pca teaching done. Pt is oob to chair.      PAIN SCORE:   5/10      SCALE USED: (1-10 VNRS)      PAST MEDICAL & SURGICAL HISTORY:  Alcohol abuse: in remission since 2017  No significant past surgical history      FAMILY HISTORY:  Family history of colon cancer (Uncle)      SOCIAL HISTORY:  [ ] Denies Smoking, Alcohol, or Drug Use    Allergies    No Known Allergies    Intolerances        PAIN MEDICATIONS:  HYDROmorphone PCA (1 mG/mL) 30 milliLiter(s) PCA Continuous PCA Continuous  ondansetron Injectable 4 milliGRAM(s) IV Push every 6 hours PRN    Heme:  heparin  Injectable 5000 Unit(s) SubCutaneous every 8 hours    Antibiotics:    Cardiovascular:    GI:    Endocrine:    All Other Medications:  naloxone Injectable 0.1 milliGRAM(s) IV Push every 3 minutes PRN  sodium chloride 0.9% lock flush 3 milliLiter(s) IV Push every 8 hours  sodium chloride 0.9% with potassium chloride 20 mEq/L 1000 milliLiter(s) IV Continuous <Continuous>  sodium chloride 0.9%. 1000 milliLiter(s) IV Continuous <Continuous>          Vital Signs Last 24 Hrs  T(C): 36.8 (15 Feb 2018 09:20), Max: 37.2 (14 Feb 2018 14:28)  T(F): 98.2 (15 Feb 2018 09:20), Max: 98.9 (14 Feb 2018 14:28)  HR: 55 (15 Feb 2018 09:20) (55 - 76)  BP: 105/60 (15 Feb 2018 09:20) (90/55 - 111/64)  BP(mean): 68 (14 Feb 2018 14:28) (60 - 68)  RR: 18 (15 Feb 2018 09:20) (12 - 20)  SpO2: 94% (15 Feb 2018 09:20) (94% - 100%)                       LABS:                          11.1   11.4  )-----------( 143      ( 15 Feb 2018 07:05 )             33.3     02-15    141  |  109<H>  |  11  ----------------------------<  135<H>  4.0   |  26  |  0.75    Ca    8.0<L>      15 Feb 2018 07:05            RADIOLOGY:    Drug Screen:            [ ]  [place]  Reviewed and Copied to Chart

## 2018-02-15 NOTE — PROGRESS NOTE ADULT - SUBJECTIVE AND OBJECTIVE BOX
INTERVAL HPI/OVERNIGHT EVENTS:  Pt stable.   NPO  No flatus/BM.    Vital Signs Last 24 Hrs  T(C): 36.8 (15 Feb 2018 05:00), Max: 37.2 (14 Feb 2018 14:28)  T(F): 98.3 (15 Feb 2018 05:00), Max: 98.9 (14 Feb 2018 14:28)  HR: 60 (15 Feb 2018 05:00) (59 - 79)  BP: 95/47 (15 Feb 2018 05:00) (90/55 - 113/46)  BP(mean): 68 (14 Feb 2018 14:28) (60 - 68)  RR: 18 (15 Feb 2018 05:00) (12 - 20)  SpO2: 100% (15 Feb 2018 05:00) (96% - 100%)    Physical:  Abdomen: Soft nondistended, nontender.  CAL dressing in place    I&O's Summary    14 Feb 2018 07:01  -  15 Feb 2018 07:00  --------------------------------------------------------  IN: 2400 mL / OUT: 1250 mL / NET: 1150 mL        LABS:                        11.1   11.4  )-----------( 143      ( 15 Feb 2018 07:05 )             33.3             02-15    141  |  109<H>  |  11  ----------------------------<  135<H>  4.0   |  26  |  0.75    Ca    8.0<L>      15 Feb 2018 07:05

## 2018-02-16 ENCOUNTER — TRANSCRIPTION ENCOUNTER (OUTPATIENT)
Age: 53
End: 2018-02-16

## 2018-02-16 LAB
ANION GAP SERPL CALC-SCNC: 9 MMOL/L — SIGNIFICANT CHANGE UP (ref 5–17)
BUN SERPL-MCNC: 11 MG/DL — SIGNIFICANT CHANGE UP (ref 7–18)
CALCIUM SERPL-MCNC: 8.7 MG/DL — SIGNIFICANT CHANGE UP (ref 8.4–10.5)
CHLORIDE SERPL-SCNC: 107 MMOL/L — SIGNIFICANT CHANGE UP (ref 96–108)
CO2 SERPL-SCNC: 25 MMOL/L — SIGNIFICANT CHANGE UP (ref 22–31)
CREAT SERPL-MCNC: 0.68 MG/DL — SIGNIFICANT CHANGE UP (ref 0.5–1.3)
GLUCOSE SERPL-MCNC: 81 MG/DL — SIGNIFICANT CHANGE UP (ref 70–99)
HCT VFR BLD CALC: 35 % — LOW (ref 39–50)
HGB BLD-MCNC: 11.1 G/DL — LOW (ref 13–17)
MCHC RBC-ENTMCNC: 30.1 PG — SIGNIFICANT CHANGE UP (ref 27–34)
MCHC RBC-ENTMCNC: 31.7 GM/DL — LOW (ref 32–36)
MCV RBC AUTO: 95 FL — SIGNIFICANT CHANGE UP (ref 80–100)
PLATELET # BLD AUTO: 144 K/UL — LOW (ref 150–400)
POTASSIUM SERPL-MCNC: 4.1 MMOL/L — SIGNIFICANT CHANGE UP (ref 3.5–5.3)
POTASSIUM SERPL-SCNC: 4.1 MMOL/L — SIGNIFICANT CHANGE UP (ref 3.5–5.3)
RBC # BLD: 3.68 M/UL — LOW (ref 4.2–5.8)
RBC # FLD: 12.8 % — SIGNIFICANT CHANGE UP (ref 10.3–14.5)
SODIUM SERPL-SCNC: 141 MMOL/L — SIGNIFICANT CHANGE UP (ref 135–145)
WBC # BLD: 7.4 K/UL — SIGNIFICANT CHANGE UP (ref 3.8–10.5)
WBC # FLD AUTO: 7.4 K/UL — SIGNIFICANT CHANGE UP (ref 3.8–10.5)

## 2018-02-16 PROCEDURE — 99233 SBSQ HOSP IP/OBS HIGH 50: CPT

## 2018-02-16 RX ORDER — SODIUM CHLORIDE 9 MG/ML
1000 INJECTION, SOLUTION INTRAVENOUS
Qty: 0 | Refills: 0 | Status: DISCONTINUED | OUTPATIENT
Start: 2018-02-16 | End: 2018-02-17

## 2018-02-16 RX ORDER — KETOROLAC TROMETHAMINE 30 MG/ML
30 SYRINGE (ML) INJECTION EVERY 6 HOURS
Qty: 0 | Refills: 0 | Status: DISCONTINUED | OUTPATIENT
Start: 2018-02-16 | End: 2018-02-17

## 2018-02-16 RX ORDER — MORPHINE SULFATE 50 MG/1
2 CAPSULE, EXTENDED RELEASE ORAL EVERY 4 HOURS
Qty: 0 | Refills: 0 | Status: DISCONTINUED | OUTPATIENT
Start: 2018-02-16 | End: 2018-02-17

## 2018-02-16 RX ADMIN — HEPARIN SODIUM 5000 UNIT(S): 5000 INJECTION INTRAVENOUS; SUBCUTANEOUS at 21:37

## 2018-02-16 RX ADMIN — MORPHINE SULFATE 2 MILLIGRAM(S): 50 CAPSULE, EXTENDED RELEASE ORAL at 15:41

## 2018-02-16 RX ADMIN — SODIUM CHLORIDE 3 MILLILITER(S): 9 INJECTION INTRAMUSCULAR; INTRAVENOUS; SUBCUTANEOUS at 05:16

## 2018-02-16 RX ADMIN — HEPARIN SODIUM 5000 UNIT(S): 5000 INJECTION INTRAVENOUS; SUBCUTANEOUS at 14:54

## 2018-02-16 RX ADMIN — MORPHINE SULFATE 2 MILLIGRAM(S): 50 CAPSULE, EXTENDED RELEASE ORAL at 16:07

## 2018-02-16 RX ADMIN — HYDROMORPHONE HYDROCHLORIDE 30 MILLILITER(S): 2 INJECTION INTRAMUSCULAR; INTRAVENOUS; SUBCUTANEOUS at 07:22

## 2018-02-16 RX ADMIN — MORPHINE SULFATE 2 MILLIGRAM(S): 50 CAPSULE, EXTENDED RELEASE ORAL at 21:37

## 2018-02-16 RX ADMIN — MORPHINE SULFATE 2 MILLIGRAM(S): 50 CAPSULE, EXTENDED RELEASE ORAL at 22:58

## 2018-02-16 RX ADMIN — SODIUM CHLORIDE 3 MILLILITER(S): 9 INJECTION INTRAMUSCULAR; INTRAVENOUS; SUBCUTANEOUS at 21:37

## 2018-02-16 RX ADMIN — HEPARIN SODIUM 5000 UNIT(S): 5000 INJECTION INTRAVENOUS; SUBCUTANEOUS at 05:18

## 2018-02-16 RX ADMIN — SODIUM CHLORIDE 3 MILLILITER(S): 9 INJECTION INTRAMUSCULAR; INTRAVENOUS; SUBCUTANEOUS at 13:55

## 2018-02-16 NOTE — PROGRESS NOTE ADULT - SUBJECTIVE AND OBJECTIVE BOX
Chief Complaint: abdominal pain    HPI:   52y Male s/p lap assisted sigmoid colon resection, pod#1.  Pt complaining of mild abdominal pain which worsens on exertion. No nausea or vomiting.  No chest pain or sob.  Pt tolerating clears.  PCA hydromorphone by bedside.        PAIN SCORE:   5/10      SCALE USED: (1-10 VNRS)    Allergies    No Known Allergies    Intolerances      MEDICATIONS  (STANDING):  dextrose 5% + sodium chloride 0.45% 1000 milliLiter(s) (125 mL/Hr) IV Continuous <Continuous>  heparin  Injectable 5000 Unit(s) SubCutaneous every 8 hours  sodium chloride 0.9% lock flush 3 milliLiter(s) IV Push every 8 hours  sodium chloride 0.9%. 1000 milliLiter(s) (500 mL/Hr) IV Continuous <Continuous>    MEDICATIONS  (PRN):  ketorolac   Injectable 30 milliGRAM(s) IV Push every 6 hours PRN Moderate Pain (4 - 6)  morphine  - Injectable 2 milliGRAM(s) IV Push every 4 hours PRN Severe Pain (7 - 10)      PHYSICAL EXAM:    Vital Signs Last 24 Hrs  T(C): 36.8 (16 Feb 2018 05:59), Max: 36.8 (15 Feb 2018 21:27)  T(F): 98.2 (16 Feb 2018 05:59), Max: 98.3 (15 Feb 2018 21:27)  HR: 52 (16 Feb 2018 05:59) (52 - 68)  BP: 111/67 (16 Feb 2018 05:59) (96/55 - 111/67)  BP(mean): --  RR: 18 (16 Feb 2018 05:59) (17 - 18)  SpO2: 96% (16 Feb 2018 05:59) (95% - 96%)             LABS:                          11.1   7.4   )-----------( 144      ( 16 Feb 2018 07:42 )             35.0     02-16    141  |  107  |  11  ----------------------------<  81  4.1   |  25  |  0.68    Ca    8.7      16 Feb 2018 07:42            Drug Screen:        RADIOLOGY:

## 2018-02-16 NOTE — DISCHARGE NOTE ADULT - PATIENT PORTAL LINK FT
You can access the [facility] Patient Portal, offered by [facility], by registering with the following website: [email or web address]

## 2018-02-16 NOTE — DISCHARGE NOTE ADULT - CARE PROVIDERS DIRECT ADDRESSES
[email or web address]

## 2018-02-16 NOTE — DISCHARGE NOTE ADULT - HOSPITAL COURSE
53 y/o male with PMH of GERD associated, hiatal hernia diagnosed with sigmoid colon carcinoma found on routine colonoscopy presented for elective  laparoscopic assisted sigmoid colon resection on 2/14/2018.    Postoperative course was uncomplicated. Pt had return of bowel function and tolerated diet.   Pt is stable for discharge home with pain medication.

## 2018-02-16 NOTE — DISCHARGE NOTE ADULT - CARE PLAN
Principal Discharge DX:	Malignant neoplasm of sigmoid colon  Goal:	remove  portion of colon  Assessment and plan of treatment:	f/u with Dr. Reid in 1 week, Ambulate as tolerated. Eat a healthy well balanced diet. Avoid heavy lifting x 4-6 weeks.

## 2018-02-16 NOTE — DISCHARGE NOTE ADULT - PLAN OF CARE
remove  portion of colon f/u with Dr. Reid in 1 week, Ambulate as tolerated. Eat a healthy well balanced diet. Avoid heavy lifting x 4-6 weeks.

## 2018-02-16 NOTE — PROGRESS NOTE ADULT - SUBJECTIVE AND OBJECTIVE BOX
s/p laparoscopic assisted sigmoid colon resection POD # 2 . Patient examined at bedside, complains of minimal incisional pain   No nausea, no vomiting  Tolerating diet  Denies flatus or BM         T(F): 98.2 (02-16-18 @ 05:59), Max: 98.3 (02-15-18 @ 21:27)  HR: 52 (02-16-18 @ 05:59) (52 - 68)  BP: 111/67 (02-16-18 @ 05:59) (96/55 - 111/67)  RR: 18 (02-16-18 @ 05:59) (17 - 18)  SpO2: 96% (02-16-18 @ 05:59) (94% - 96%)  Wt(kg): --      02-15 @ 07:01  -  02-16 @ 07:00  --------------------------------------------------------  IN:  Total IN: 0 mL    OUT:    Indwelling Catheter - Urethral: 680 mL  Total OUT: 680 mL    Total NET: -680 mL                          11.1   11.4  )-----------( 143      ( 15 Feb 2018 07:05 )             33.3         02-15    141  |  109<H>  |  11  ----------------------------<  135<H>  4.0   |  26  |  0.75    Ca    8.0<L>      15 Feb 2018 07:05        Physical Exam  General: AAOx3, No acute distress  Skin: No jaundice, no icterus  Abdomen: soft, brook incisional tenderness, mildly distended, no rebound tenderness, no guarding, no palpable masses, mango dressing in place, dry.   : Normal external genitalia  Extremities: non edematous, no calf pain bilaterally

## 2018-02-16 NOTE — PROGRESS NOTE ADULT - PROBLEM SELECTOR PLAN 1
S/P Laparoscopic assisted sigmoid colectomy  Doing well  Pain control  Postop care
1. clear liquid diet   2. PCa pain control  3. oob ambulation
d/c knight  OOB to chair/bathroom  sips of water  IVF  PCA prn  DVT ppx  incentive spirometry  keep CAL on self suction
- dc pca  - morphine 2mg ivp q 4 hours prn  - toradol 30mg ivp q 6 hours prn  - oob and ambulating

## 2018-02-16 NOTE — PROGRESS NOTE ADULT - PROBLEM SELECTOR PROBLEM 1
Malignant neoplasm of sigmoid colon
Acute post-operative pain

## 2018-02-16 NOTE — DISCHARGE NOTE ADULT - MEDICATION SUMMARY - MEDICATIONS TO TAKE
I will START or STAY ON the medications listed below when I get home from the hospital:    oxyCODONE-acetaminophen 5 mg-325 mg oral tablet  -- 1 tab(s) by mouth every 6 hours, As Needed -for moderate pain MDD:4 tablets   -- Caution federal law prohibits the transfer of this drug to any person other  than the person for whom it was prescribed.  May cause drowsiness.  Alcohol may intensify this effect.  Use care when operating dangerous machinery.  This prescription cannot be refilled.  This product contains acetaminophen.  Do not use  with any other product containing acetaminophen to prevent possible liver damage.  Using more of this medication than prescribed may cause serious breathing problems.    -- Indication: For Malignant neoplasm of sigmoid colon

## 2018-02-16 NOTE — PROGRESS NOTE ADULT - SUBJECTIVE AND OBJECTIVE BOX
INTERVAL HPI/OVERNIGHT EVENTS:  Pt stable.   Tolerating diet.   flatus/BM.    Vital Signs Last 24 Hrs  T(C): 37.2 (16 Feb 2018 14:17), Max: 37.2 (16 Feb 2018 14:17)  T(F): 98.9 (16 Feb 2018 14:17), Max: 98.9 (16 Feb 2018 14:17)  HR: 68 (16 Feb 2018 14:17) (52 - 68)  BP: 113/79 (16 Feb 2018 14:17) (96/55 - 113/79)  BP(mean): --  RR: 17 (16 Feb 2018 14:17) (17 - 18)  SpO2: 98% (16 Feb 2018 14:17) (95% - 98%)    Physical:  Abdomen: Soft nondistended, nontender.  CAL dressing in place    I&O's Summary    15 Feb 2018 07:01  -  16 Feb 2018 07:00  --------------------------------------------------------  IN: 0 mL / OUT: 680 mL / NET: -680 mL        LABS:                        11.1   7.4   )-----------( 144      ( 16 Feb 2018 07:42 )             35.0             02-16    141  |  107  |  11  ----------------------------<  81  4.1   |  25  |  0.68    Ca    8.7      16 Feb 2018 07:42

## 2018-02-17 VITALS
OXYGEN SATURATION: 100 % | TEMPERATURE: 99 F | RESPIRATION RATE: 17 BRPM | HEART RATE: 78 BPM | SYSTOLIC BLOOD PRESSURE: 99 MMHG | DIASTOLIC BLOOD PRESSURE: 57 MMHG

## 2018-02-17 LAB
ANION GAP SERPL CALC-SCNC: 6 MMOL/L — SIGNIFICANT CHANGE UP (ref 5–17)
BUN SERPL-MCNC: 9 MG/DL — SIGNIFICANT CHANGE UP (ref 7–18)
CALCIUM SERPL-MCNC: 9.1 MG/DL — SIGNIFICANT CHANGE UP (ref 8.4–10.5)
CHLORIDE SERPL-SCNC: 104 MMOL/L — SIGNIFICANT CHANGE UP (ref 96–108)
CO2 SERPL-SCNC: 29 MMOL/L — SIGNIFICANT CHANGE UP (ref 22–31)
CREAT SERPL-MCNC: 0.9 MG/DL — SIGNIFICANT CHANGE UP (ref 0.5–1.3)
GLUCOSE SERPL-MCNC: 90 MG/DL — SIGNIFICANT CHANGE UP (ref 70–99)
HCT VFR BLD CALC: 39.6 % — SIGNIFICANT CHANGE UP (ref 39–50)
HGB BLD-MCNC: 13.2 G/DL — SIGNIFICANT CHANGE UP (ref 13–17)
MCHC RBC-ENTMCNC: 32 PG — SIGNIFICANT CHANGE UP (ref 27–34)
MCHC RBC-ENTMCNC: 33.4 GM/DL — SIGNIFICANT CHANGE UP (ref 32–36)
MCV RBC AUTO: 96 FL — SIGNIFICANT CHANGE UP (ref 80–100)
PLATELET # BLD AUTO: 164 K/UL — SIGNIFICANT CHANGE UP (ref 150–400)
POTASSIUM SERPL-MCNC: 3.7 MMOL/L — SIGNIFICANT CHANGE UP (ref 3.5–5.3)
POTASSIUM SERPL-SCNC: 3.7 MMOL/L — SIGNIFICANT CHANGE UP (ref 3.5–5.3)
RBC # BLD: 4.12 M/UL — LOW (ref 4.2–5.8)
RBC # FLD: 12.7 % — SIGNIFICANT CHANGE UP (ref 10.3–14.5)
SODIUM SERPL-SCNC: 139 MMOL/L — SIGNIFICANT CHANGE UP (ref 135–145)
WBC # BLD: 7.7 K/UL — SIGNIFICANT CHANGE UP (ref 3.8–10.5)
WBC # FLD AUTO: 7.7 K/UL — SIGNIFICANT CHANGE UP (ref 3.8–10.5)

## 2018-02-17 PROCEDURE — 82962 GLUCOSE BLOOD TEST: CPT

## 2018-02-17 PROCEDURE — 85027 COMPLETE CBC AUTOMATED: CPT

## 2018-02-17 PROCEDURE — 88304 TISSUE EXAM BY PATHOLOGIST: CPT

## 2018-02-17 PROCEDURE — 88309 TISSUE EXAM BY PATHOLOGIST: CPT

## 2018-02-17 PROCEDURE — 86850 RBC ANTIBODY SCREEN: CPT

## 2018-02-17 PROCEDURE — 80048 BASIC METABOLIC PNL TOTAL CA: CPT

## 2018-02-17 PROCEDURE — 86900 BLOOD TYPING SEROLOGIC ABO: CPT

## 2018-02-17 PROCEDURE — 86901 BLOOD TYPING SEROLOGIC RH(D): CPT

## 2018-02-17 RX ADMIN — Medication 30 MILLIGRAM(S): at 01:56

## 2018-02-17 RX ADMIN — SODIUM CHLORIDE 3 MILLILITER(S): 9 INJECTION INTRAMUSCULAR; INTRAVENOUS; SUBCUTANEOUS at 05:16

## 2018-02-17 RX ADMIN — SODIUM CHLORIDE 3 MILLILITER(S): 9 INJECTION INTRAMUSCULAR; INTRAVENOUS; SUBCUTANEOUS at 13:44

## 2018-02-17 RX ADMIN — Medication 30 MILLIGRAM(S): at 02:20

## 2018-02-17 RX ADMIN — HEPARIN SODIUM 5000 UNIT(S): 5000 INJECTION INTRAVENOUS; SUBCUTANEOUS at 05:16

## 2018-02-17 NOTE — PROGRESS NOTE ADULT - SUBJECTIVE AND OBJECTIVE BOX
Post Operative Day #: 3    SUBJECTIVE: 52y Male s/p lap assisted sigmoid resection 2/14    INTERVAL HPI/OVERNIGHT EVENTS:    Flatus:y            Bowel Movement: y    Pain Control Adequate: y    Nausea:        n   Vomiting: n    Diet: reg, tolerating      MEDICATIONS  (STANDING):  dextrose 5% + sodium chloride 0.45% 1000 milliLiter(s) (125 mL/Hr) IV Continuous <Continuous>  heparin  Injectable 5000 Unit(s) SubCutaneous every 8 hours  sodium chloride 0.9% lock flush 3 milliLiter(s) IV Push every 8 hours  sodium chloride 0.9%. 1000 milliLiter(s) (500 mL/Hr) IV Continuous <Continuous>    MEDICATIONS  (PRN):  ketorolac   Injectable 30 milliGRAM(s) IV Push every 6 hours PRN Moderate Pain (4 - 6)  morphine  - Injectable 2 milliGRAM(s) IV Push every 4 hours PRN Severe Pain (7 - 10)      OBJECTIVE:  Vital Signs Last 24 Hrs  T(C): 37.1 (17 Feb 2018 04:39), Max: 37.2 (16 Feb 2018 14:17)  T(F): 98.7 (17 Feb 2018 04:39), Max: 98.9 (16 Feb 2018 14:17)  HR: 52 (17 Feb 2018 04:39) (52 - 68)  BP: 111/62 (17 Feb 2018 04:39) (111/62 - 120/67)  BP(mean): --  RR: 18 (17 Feb 2018 04:39) (17 - 18)  SpO2: 100% (17 Feb 2018 04:39) (98% - 100%)    Physical Exam:    Gen: awake, alert oriented NAD  Abdomen: CAL dressing in place, soft incisional tenderness. wound edges approximated well, healing  Extremities: warm to touch no c/c/e, no calf tenderness bilaterally      I&O's Detail                            13.2   7.7   )-----------( 164      ( 17 Feb 2018 07:46 )             39.6     17 Feb 2018 07:46    139    |  104    |  9      ----------------------------<  90     3.7     |  29     |  0.90   16 Feb 2018 07:42    141    |  107    |  11     ----------------------------<  81     4.1     |  25     |  0.68   15 Feb 2018 07:05    141    |  109    |  11     ----------------------------<  135    4.0     |  26     |  0.75     Ca    9.1        17 Feb 2018 07:46  Ca    8.7        16 Feb 2018 07:42  Ca    8.0        15 Feb 2018 07:05 Post Operative Day #: 3    SUBJECTIVE: 52y Male s/p lap assisted sigmoid resection 2/14    INTERVAL HPI/OVERNIGHT EVENTS: No complaints. Pain minimal. Passing flatus and BM.    Flatus:y            Bowel Movement: y    Pain Control Adequate: y    Nausea:        n   Vomiting: n    Diet: reg, tolerating      MEDICATIONS  (STANDING):  dextrose 5% + sodium chloride 0.45% 1000 milliLiter(s) (125 mL/Hr) IV Continuous <Continuous>  heparin  Injectable 5000 Unit(s) SubCutaneous every 8 hours  sodium chloride 0.9% lock flush 3 milliLiter(s) IV Push every 8 hours  sodium chloride 0.9%. 1000 milliLiter(s) (500 mL/Hr) IV Continuous <Continuous>    MEDICATIONS  (PRN):  ketorolac   Injectable 30 milliGRAM(s) IV Push every 6 hours PRN Moderate Pain (4 - 6)  morphine  - Injectable 2 milliGRAM(s) IV Push every 4 hours PRN Severe Pain (7 - 10)      OBJECTIVE:  Vital Signs Last 24 Hrs  T(C): 37.1 (17 Feb 2018 04:39), Max: 37.2 (16 Feb 2018 14:17)  T(F): 98.7 (17 Feb 2018 04:39), Max: 98.9 (16 Feb 2018 14:17)  HR: 52 (17 Feb 2018 04:39) (52 - 68)  BP: 111/62 (17 Feb 2018 04:39) (111/62 - 120/67)  BP(mean): --  RR: 18 (17 Feb 2018 04:39) (17 - 18)  SpO2: 100% (17 Feb 2018 04:39) (98% - 100%)    Physical Exam:    Gen: awake, alert oriented NAD  Abdomen: CAL dressing in place, soft incisional tenderness. wound edges approximated well, healing  Extremities: warm to touch no c/c/e, no calf tenderness bilaterally      I&O's Detail                            13.2   7.7   )-----------( 164      ( 17 Feb 2018 07:46 )             39.6     17 Feb 2018 07:46    139    |  104    |  9      ----------------------------<  90     3.7     |  29     |  0.90   16 Feb 2018 07:42    141    |  107    |  11     ----------------------------<  81     4.1     |  25     |  0.68   15 Feb 2018 07:05    141    |  109    |  11     ----------------------------<  135    4.0     |  26     |  0.75     Ca    9.1        17 Feb 2018 07:46  Ca    8.7        16 Feb 2018 07:42  Ca    8.0        15 Feb 2018 07:05

## 2018-02-17 NOTE — PROGRESS NOTE ADULT - ATTENDING COMMENTS
Agree w above  Clinically well appearing  Tolerating diet. +flatus and BM. Abd exam benign.    Stable for dc home today

## 2018-02-17 NOTE — PROGRESS NOTE ADULT - ASSESSMENT
s/p lap assisted sigmoid colon resection 2/14  1. d/c home  with CAL dressing, new dressing placed today  2. f/u with Tenembaum in 1 week

## 2018-02-20 LAB — SURGICAL PATHOLOGY FINAL REPORT - CH: SIGNIFICANT CHANGE UP

## 2018-02-22 ENCOUNTER — APPOINTMENT (OUTPATIENT)
Dept: SURGERY | Facility: CLINIC | Age: 53
End: 2018-02-22
Payer: COMMERCIAL

## 2018-02-22 VITALS
TEMPERATURE: 99 F | SYSTOLIC BLOOD PRESSURE: 111 MMHG | DIASTOLIC BLOOD PRESSURE: 73 MMHG | BODY MASS INDEX: 22.53 KG/M2 | HEIGHT: 73 IN | WEIGHT: 170 LBS | HEART RATE: 99 BPM

## 2018-02-22 DIAGNOSIS — Z78.9 OTHER SPECIFIED HEALTH STATUS: ICD-10-CM

## 2018-02-22 DIAGNOSIS — Z80.0 FAMILY HISTORY OF MALIGNANT NEOPLASM OF DIGESTIVE ORGANS: ICD-10-CM

## 2018-02-22 DIAGNOSIS — F10.11 ALCOHOL ABUSE, IN REMISSION: ICD-10-CM

## 2018-02-22 DIAGNOSIS — C18.9 MALIGNANT NEOPLASM OF COLON, UNSPECIFIED: ICD-10-CM

## 2018-02-22 DIAGNOSIS — F17.200 NICOTINE DEPENDENCE, UNSPECIFIED, UNCOMPLICATED: ICD-10-CM

## 2018-02-22 PROCEDURE — 99024 POSTOP FOLLOW-UP VISIT: CPT

## 2018-03-08 ENCOUNTER — APPOINTMENT (OUTPATIENT)
Dept: SURGERY | Facility: CLINIC | Age: 53
End: 2018-03-08
Payer: COMMERCIAL

## 2018-03-08 VITALS
OXYGEN SATURATION: 100 % | DIASTOLIC BLOOD PRESSURE: 74 MMHG | HEART RATE: 92 BPM | TEMPERATURE: 98.7 F | SYSTOLIC BLOOD PRESSURE: 108 MMHG

## 2018-03-08 PROCEDURE — 99024 POSTOP FOLLOW-UP VISIT: CPT

## 2018-03-16 ENCOUNTER — EMERGENCY (EMERGENCY)
Facility: HOSPITAL | Age: 53
LOS: 1 days | Discharge: HOME | End: 2018-03-16
Attending: EMERGENCY MEDICINE
Payer: COMMERCIAL

## 2018-03-16 VITALS
DIASTOLIC BLOOD PRESSURE: 69 MMHG | RESPIRATION RATE: 20 BRPM | WEIGHT: 169.98 LBS | HEIGHT: 74 IN | SYSTOLIC BLOOD PRESSURE: 100 MMHG | OXYGEN SATURATION: 97 % | TEMPERATURE: 99 F | HEART RATE: 95 BPM

## 2018-03-16 VITALS
DIASTOLIC BLOOD PRESSURE: 68 MMHG | RESPIRATION RATE: 20 BRPM | OXYGEN SATURATION: 99 % | HEART RATE: 80 BPM | SYSTOLIC BLOOD PRESSURE: 112 MMHG | TEMPERATURE: 99 F

## 2018-03-16 DIAGNOSIS — Z98.890 OTHER SPECIFIED POSTPROCEDURAL STATES: Chronic | ICD-10-CM

## 2018-03-16 LAB
ALBUMIN SERPL ELPH-MCNC: 3 G/DL — LOW (ref 3.5–5)
ALP SERPL-CCNC: 109 U/L — SIGNIFICANT CHANGE UP (ref 40–120)
ALT FLD-CCNC: 18 U/L DA — SIGNIFICANT CHANGE UP (ref 10–60)
ANION GAP SERPL CALC-SCNC: 9 MMOL/L — SIGNIFICANT CHANGE UP (ref 5–17)
APPEARANCE UR: CLEAR — SIGNIFICANT CHANGE UP
APTT BLD: 30.7 SEC — SIGNIFICANT CHANGE UP (ref 27.5–37.4)
AST SERPL-CCNC: 7 U/L — LOW (ref 10–40)
BASOPHILS # BLD AUTO: 0.1 K/UL — SIGNIFICANT CHANGE UP (ref 0–0.2)
BASOPHILS NFR BLD AUTO: 0.8 % — SIGNIFICANT CHANGE UP (ref 0–2)
BILIRUB SERPL-MCNC: 0.8 MG/DL — SIGNIFICANT CHANGE UP (ref 0.2–1.2)
BILIRUB UR-MCNC: NEGATIVE — SIGNIFICANT CHANGE UP
BUN SERPL-MCNC: 17 MG/DL — SIGNIFICANT CHANGE UP (ref 7–18)
CALCIUM SERPL-MCNC: 9.1 MG/DL — SIGNIFICANT CHANGE UP (ref 8.4–10.5)
CHLORIDE SERPL-SCNC: 97 MMOL/L — SIGNIFICANT CHANGE UP (ref 96–108)
CO2 SERPL-SCNC: 27 MMOL/L — SIGNIFICANT CHANGE UP (ref 22–31)
COLOR SPEC: YELLOW — SIGNIFICANT CHANGE UP
CREAT SERPL-MCNC: 0.73 MG/DL — SIGNIFICANT CHANGE UP (ref 0.5–1.3)
DIFF PNL FLD: NEGATIVE — SIGNIFICANT CHANGE UP
EOSINOPHIL # BLD AUTO: 0.2 K/UL — SIGNIFICANT CHANGE UP (ref 0–0.5)
EOSINOPHIL NFR BLD AUTO: 1.1 % — SIGNIFICANT CHANGE UP (ref 0–6)
GLUCOSE SERPL-MCNC: 87 MG/DL — SIGNIFICANT CHANGE UP (ref 70–99)
GLUCOSE UR QL: NEGATIVE — SIGNIFICANT CHANGE UP
HCT VFR BLD CALC: 34.2 % — LOW (ref 39–50)
HGB BLD-MCNC: 11.5 G/DL — LOW (ref 13–17)
INR BLD: 1.35 RATIO — HIGH (ref 0.88–1.16)
KETONES UR-MCNC: ABNORMAL
LEUKOCYTE ESTERASE UR-ACNC: NEGATIVE — SIGNIFICANT CHANGE UP
LYMPHOCYTES # BLD AUTO: 13.6 % — SIGNIFICANT CHANGE UP (ref 13–44)
LYMPHOCYTES # BLD AUTO: 2.4 K/UL — SIGNIFICANT CHANGE UP (ref 1–3.3)
MCHC RBC-ENTMCNC: 31.2 PG — SIGNIFICANT CHANGE UP (ref 27–34)
MCHC RBC-ENTMCNC: 33.5 GM/DL — SIGNIFICANT CHANGE UP (ref 32–36)
MCV RBC AUTO: 93.2 FL — SIGNIFICANT CHANGE UP (ref 80–100)
MONOCYTES # BLD AUTO: 1.1 K/UL — HIGH (ref 0–0.9)
MONOCYTES NFR BLD AUTO: 6.2 % — SIGNIFICANT CHANGE UP (ref 2–14)
NEUTROPHILS # BLD AUTO: 13.7 K/UL — HIGH (ref 1.8–7.4)
NEUTROPHILS NFR BLD AUTO: 78.4 % — HIGH (ref 43–77)
NITRITE UR-MCNC: NEGATIVE — SIGNIFICANT CHANGE UP
PH UR: 6 — SIGNIFICANT CHANGE UP (ref 5–8)
PLATELET # BLD AUTO: 440 K/UL — HIGH (ref 150–400)
POTASSIUM SERPL-MCNC: 3.8 MMOL/L — SIGNIFICANT CHANGE UP (ref 3.5–5.3)
POTASSIUM SERPL-SCNC: 3.8 MMOL/L — SIGNIFICANT CHANGE UP (ref 3.5–5.3)
PROT SERPL-MCNC: 7.8 G/DL — SIGNIFICANT CHANGE UP (ref 6–8.3)
PROT UR-MCNC: 30 MG/DL
PROTHROM AB SERPL-ACNC: 14.8 SEC — HIGH (ref 9.8–12.7)
RBC # BLD: 3.67 M/UL — LOW (ref 4.2–5.8)
RBC # FLD: 12.8 % — SIGNIFICANT CHANGE UP (ref 10.3–14.5)
SODIUM SERPL-SCNC: 133 MMOL/L — LOW (ref 135–145)
SP GR SPEC: 1.02 — SIGNIFICANT CHANGE UP (ref 1.01–1.02)
UROBILINOGEN FLD QL: NEGATIVE — SIGNIFICANT CHANGE UP
WBC # BLD: 17.5 K/UL — HIGH (ref 3.8–10.5)
WBC # FLD AUTO: 17.5 K/UL — HIGH (ref 3.8–10.5)

## 2018-03-16 PROCEDURE — 87070 CULTURE OTHR SPECIMN AEROBIC: CPT

## 2018-03-16 PROCEDURE — 80053 COMPREHEN METABOLIC PANEL: CPT

## 2018-03-16 PROCEDURE — 99284 EMERGENCY DEPT VISIT MOD MDM: CPT | Mod: 25

## 2018-03-16 PROCEDURE — 99285 EMERGENCY DEPT VISIT HI MDM: CPT

## 2018-03-16 PROCEDURE — 10160 PNXR ASPIR ABSC HMTMA BULLA: CPT

## 2018-03-16 PROCEDURE — 85730 THROMBOPLASTIN TIME PARTIAL: CPT

## 2018-03-16 PROCEDURE — 96375 TX/PRO/DX INJ NEW DRUG ADDON: CPT

## 2018-03-16 PROCEDURE — 87205 SMEAR GRAM STAIN: CPT

## 2018-03-16 PROCEDURE — 87086 URINE CULTURE/COLONY COUNT: CPT

## 2018-03-16 PROCEDURE — 85027 COMPLETE CBC AUTOMATED: CPT

## 2018-03-16 PROCEDURE — 85610 PROTHROMBIN TIME: CPT

## 2018-03-16 PROCEDURE — 81001 URINALYSIS AUTO W/SCOPE: CPT

## 2018-03-16 PROCEDURE — 87040 BLOOD CULTURE FOR BACTERIA: CPT

## 2018-03-16 PROCEDURE — 96374 THER/PROPH/DIAG INJ IV PUSH: CPT | Mod: XU

## 2018-03-16 PROCEDURE — 87186 SC STD MICRODIL/AGAR DIL: CPT

## 2018-03-16 RX ORDER — AMPICILLIN SODIUM AND SULBACTAM SODIUM 250; 125 MG/ML; MG/ML
3 INJECTION, POWDER, FOR SUSPENSION INTRAMUSCULAR; INTRAVENOUS ONCE
Qty: 0 | Refills: 0 | Status: COMPLETED | OUTPATIENT
Start: 2018-03-16 | End: 2018-03-16

## 2018-03-16 RX ORDER — VANCOMYCIN HCL 1 G
1000 VIAL (EA) INTRAVENOUS ONCE
Qty: 0 | Refills: 0 | Status: DISCONTINUED | OUTPATIENT
Start: 2018-03-16 | End: 2018-03-16

## 2018-03-16 RX ORDER — IBUPROFEN 200 MG
1 TABLET ORAL
Qty: 20 | Refills: 0 | OUTPATIENT
Start: 2018-03-16

## 2018-03-16 RX ORDER — MORPHINE SULFATE 50 MG/1
4 CAPSULE, EXTENDED RELEASE ORAL ONCE
Qty: 0 | Refills: 0 | Status: DISCONTINUED | OUTPATIENT
Start: 2018-03-16 | End: 2018-03-16

## 2018-03-16 RX ADMIN — AMPICILLIN SODIUM AND SULBACTAM SODIUM 200 GRAM(S): 250; 125 INJECTION, POWDER, FOR SUSPENSION INTRAMUSCULAR; INTRAVENOUS at 13:35

## 2018-03-16 RX ADMIN — MORPHINE SULFATE 4 MILLIGRAM(S): 50 CAPSULE, EXTENDED RELEASE ORAL at 13:50

## 2018-03-16 RX ADMIN — MORPHINE SULFATE 4 MILLIGRAM(S): 50 CAPSULE, EXTENDED RELEASE ORAL at 13:34

## 2018-03-16 NOTE — CONSULT NOTE ADULT - ASSESSMENT
seroma at incision site.  cellulitis, no evidence of fascitis  leukocytosis is likely to return to normal since wound self drained this am and no purulence returned with aspiration.  Pt will follow up for examination tomorrow in this hospital, or return sooner if any fever or severe pain develops.  Pt will start po abx augmentin 875mg po bid

## 2018-03-16 NOTE — ED PROVIDER NOTE - MEDICAL DECISION MAKING DETAILS
53 y/o M pt with wound infection and cellulitis. Will get CT to check for abscess, consult surgery, and likely admit.

## 2018-03-16 NOTE — CONSULT NOTE ADULT - SUBJECTIVE AND OBJECTIVE BOX
Pt 1 month post sigmoid resection c/o erythema at wound site for few days associated with drainage of fluid this Am at 4am. Denies measured fever. no n/v.  No abd pain. no change bm.  no pain with movement.    vss a/f  Abd: LLQ: 15cm erythema with edema, mild tenderness. minimal fluctuance, no crepitus  abd: soft, no guarding.      Under sterile conditions: wound aspiratedwith 18g syringe, returnded 8cc serosanguinous drainage. No purulence.  sterile dressing applied.                            11.5   17.5  )-----------( 440      ( 16 Mar 2018 13:34 )             34.2     03-16    133<L>  |  97  |  17  ----------------------------<  87  3.8   |  27  |  0.73    Ca    9.1      16 Mar 2018 13:34    TPro  7.8  /  Alb  3.0<L>  /  TBili  0.8  /  DBili  x   /  AST  7<L>  /  ALT  18  /  AlkPhos  109  03-16

## 2018-03-16 NOTE — ED ADULT NURSE NOTE - CHIEF COMPLAINT QUOTE
LLQ abd pain, leaking from surgical wound and redness noted to surgical site x yesterday. Pt had abd surgery x 4 weeks ago.

## 2018-03-16 NOTE — ED ADULT NURSE NOTE - OBJECTIVE STATEMENT
Patient came to the ED a/o x 3 ambulates c/o left lower abdominal wound drainage. Pt has recent abdominal sx, with dressing + redness, + swelling.

## 2018-03-16 NOTE — ED PROVIDER NOTE - PROGRESS NOTE DETAILS
Spoke with rishi SAUCEDA. He will come see pt in the ER. Patient is resting comfortably, NAD. Patient seen by surgery PA, who d/w Dr. Reid and Dr. Landry (ID). He also drained a large amount of serous fluid from surgical wound. No purulence. Requested to cancel CT abdomen/pelvis and vancomycin. Requested augmentin 875mg twice a day for 7 days. Return in 24 hours for wound check. KOMAL Hill will be in the hospital tomorrow to recheck patient.

## 2018-03-16 NOTE — ED PROVIDER NOTE - OBJECTIVE STATEMENT
53 y/o M pt with recent partial colectomy done by Dr. Humphreys, presents to ED c/o redness and swelling to the LLQ surgical sight x 4 days, associated with low grade fever and draining x this morning. Pt denies nausea, vomiting, diarrhea, abd pain, bleeding, or any other complaints. NKDA.

## 2018-03-17 ENCOUNTER — EMERGENCY (EMERGENCY)
Facility: HOSPITAL | Age: 53
LOS: 1 days | Discharge: HOME | End: 2018-03-17
Attending: EMERGENCY MEDICINE
Payer: COMMERCIAL

## 2018-03-17 VITALS
WEIGHT: 169.98 LBS | TEMPERATURE: 98 F | OXYGEN SATURATION: 99 % | RESPIRATION RATE: 16 BRPM | HEIGHT: 74 IN | DIASTOLIC BLOOD PRESSURE: 73 MMHG | HEART RATE: 90 BPM | SYSTOLIC BLOOD PRESSURE: 116 MMHG

## 2018-03-17 DIAGNOSIS — Z98.890 OTHER SPECIFIED POSTPROCEDURAL STATES: Chronic | ICD-10-CM

## 2018-03-17 LAB
CULTURE RESULTS: NO GROWTH — SIGNIFICANT CHANGE UP
HCT VFR BLD CALC: 34.2 % — LOW (ref 39–50)
HGB BLD-MCNC: 11 G/DL — LOW (ref 13–17)
MCHC RBC-ENTMCNC: 30 PG — SIGNIFICANT CHANGE UP (ref 27–34)
MCHC RBC-ENTMCNC: 32 GM/DL — SIGNIFICANT CHANGE UP (ref 32–36)
MCV RBC AUTO: 93.6 FL — SIGNIFICANT CHANGE UP (ref 80–100)
PLATELET # BLD AUTO: 477 K/UL — HIGH (ref 150–400)
RBC # BLD: 3.66 M/UL — LOW (ref 4.2–5.8)
RBC # FLD: 13 % — SIGNIFICANT CHANGE UP (ref 10.3–14.5)
SPECIMEN SOURCE: SIGNIFICANT CHANGE UP
WBC # BLD: 13 K/UL — HIGH (ref 3.8–10.5)
WBC # FLD AUTO: 13 K/UL — HIGH (ref 3.8–10.5)

## 2018-03-17 PROCEDURE — 99283 EMERGENCY DEPT VISIT LOW MDM: CPT

## 2018-03-17 PROCEDURE — 99284 EMERGENCY DEPT VISIT MOD MDM: CPT

## 2018-03-17 PROCEDURE — 85027 COMPLETE CBC AUTOMATED: CPT

## 2018-03-17 NOTE — PROGRESS NOTE ADULT - SUBJECTIVE AND OBJECTIVE BOX
Pt 51 y/o man s/p sigmoid resection Feb 14, 2018, came yesterday for LLQ abdominal wound pain and erythema. Wound was examined by Dr Reid and was found to have seroma on aspiration.   Pt's WBC 17 yestaerday  Pt was sent home with Augmentin 875/125 BID.  Pt returned today for wound check.  Pt denies N/v, fever/chills, any purulent discharge from wound.    PE: comfortable, NAD  Vital Signs Last 24 Hrs  T(C): 36.9 (17 Mar 2018 11:34), Max: 37 (16 Mar 2018 15:50)  T(F): 98.5 (17 Mar 2018 11:34), Max: 98.6 (16 Mar 2018 15:50)  HR: 90 (17 Mar 2018 11:34) (80 - 90)  BP: 116/73 (17 Mar 2018 11:34) (112/68 - 116/73)  BP(mean): --  RR: 16 (17 Mar 2018 11:34) (16 - 20)  SpO2: 99% (17 Mar 2018 11:34) (99% - 99%)    Abd: soft, ND, +BS, LLQ wound with lateral edge opening draining non-purulent, no-foul-smelling, serosanguinous fluid, mild surrounding cellulitis  dressing changed                          11.0   13.0  )-----------( 477      ( 17 Mar 2018 12:02 )             34.2

## 2018-03-17 NOTE — ED PROVIDER NOTE - ATTENDING CONTRIBUTION TO CARE
51 y/o M presents for wound check s/p recent partial colectomy done by Dr. Call 4 weeks ago. Pt seen in ED yesterday, found with elevated WBC, and put on Augmentin for cellulitis. Pt reports that the redness has improved, although mild drainage from the wound this morning. PE reveals L sided healing incision with wound dehiscence at the lateral border, some serosanguinous discharge w/ surrounding cellulitis, looks to be improving. WBC improved. Surgical House Staff evaluated pt in consultation in ED- states pt with wound seroma. Recc continuing augmentin and f/u with Dr. Cole in 48 hours.

## 2018-03-17 NOTE — ED PROVIDER NOTE - PHYSICAL EXAMINATION
Skin Wound Description: L sided healing incision with wound dehiscence at the lateral border, some serosanguinous discharge w/ surrounding cellulitis, looks to be improving

## 2018-03-17 NOTE — ED PROVIDER NOTE - PROGRESS NOTE DETAILS
seen by surgery.  will d/c home.  wound likely seroma.  pt will continue abx and follow up with his surgeron seen by surgery.  will d/c home.  wound likely seroma.  pt will continue abx and follow up with his surgeon

## 2018-03-17 NOTE — ED ADULT NURSE NOTE - OBJECTIVE STATEMENT
pt is a 53 y/o male w/c/o wound check  of surgical wound s/p recent  partial colectomy done by DR carr x 4 weeks. + drainage of the wound this morning

## 2018-03-17 NOTE — PROGRESS NOTE ADULT - ASSESSMENT
S/p sigmoid colon resection with wound seroma with mild cellulitis    Pt's WBC went down to 13 from 17.  No fever/chills, tolerating diet with normal BM's    -Cont Augmentin for 7 days  -Dressing change bid/PRN  -Pt was told to wear loose fitting clothes as tight belt causing pressure trauma to wound  -Motrin for pain  -F/U with Dr Reid clinic

## 2018-03-17 NOTE — ED PROVIDER NOTE - OBJECTIVE STATEMENT
53 y/o M pt presents to ED for wound check of surgical wound s/p recent partial colectomy done by Dr. Humphreys x 4 weeks ago. Pt states he was seen in ED yesterday and put on Augmentin for cellulitis. Pt reports that the redness has improved, although he had a some drainage from the wound x this morning. Pt states the area "stings", otherwise denies any fever, chills, or any other complaints. NKDA.

## 2018-03-18 LAB
-  AMIKACIN: SIGNIFICANT CHANGE UP
-  AMPICILLIN/SULBACTAM: SIGNIFICANT CHANGE UP
-  AMPICILLIN: SIGNIFICANT CHANGE UP
-  AZTREONAM: SIGNIFICANT CHANGE UP
-  CEFAZOLIN: SIGNIFICANT CHANGE UP
-  CEFEPIME: SIGNIFICANT CHANGE UP
-  CEFOXITIN: SIGNIFICANT CHANGE UP
-  CEFTAZIDIME: SIGNIFICANT CHANGE UP
-  CEFTRIAXONE: SIGNIFICANT CHANGE UP
-  CIPROFLOXACIN: SIGNIFICANT CHANGE UP
-  ERTAPENEM: SIGNIFICANT CHANGE UP
-  GENTAMICIN: SIGNIFICANT CHANGE UP
-  IMIPENEM: SIGNIFICANT CHANGE UP
-  LEVOFLOXACIN: SIGNIFICANT CHANGE UP
-  MEROPENEM: SIGNIFICANT CHANGE UP
-  PIPERACILLIN/TAZOBACTAM: SIGNIFICANT CHANGE UP
-  TOBRAMYCIN: SIGNIFICANT CHANGE UP
-  TRIMETHOPRIM/SULFAMETHOXAZOLE: SIGNIFICANT CHANGE UP
METHOD TYPE: SIGNIFICANT CHANGE UP

## 2018-03-21 LAB
CULTURE RESULTS: SIGNIFICANT CHANGE UP
ORGANISM # SPEC MICROSCOPIC CNT: SIGNIFICANT CHANGE UP
ORGANISM # SPEC MICROSCOPIC CNT: SIGNIFICANT CHANGE UP
SPECIMEN SOURCE: SIGNIFICANT CHANGE UP

## 2018-07-16 PROBLEM — F10.10 ALCOHOL ABUSE, UNCOMPLICATED: Chronic | Status: ACTIVE | Noted: 2017-07-09

## 2020-10-19 NOTE — PATIENT PROFILE ADULT. - CLICK TO LAUNCH ORM
Pt is tolerating AC 18, 320, +8@30%. Pt has small thick tan and yellow secretions. RSBI was 64 so left pt on PS 8/8@30%. Will continue to monitor.   .

## 2021-06-29 NOTE — PATIENT PROFILE ADULT. - FUNCTIONAL SCREEN CURRENT LEVEL: BATHING, MLM
(0) independent Ear Wedge Repair Text: A wedge excision was completed by carrying down an excision through the full thickness of the ear and cartilage with an inward facing Burow's triangle. The wound was then closed in a layered fashion.

## 2024-04-24 NOTE — H&P PST ADULT - NS MD HP HEP C STATUS
Assessment/Plan   Diagnoses and all orders for this visit:  Retinal hemorrhage noted on examination of right eye  -     OCT, Retina - OU - Both Eyes  Pre-diabetes  Lamellar macular hole of left eye  Diabetes mellitus screening  Congenital hypertrophy of retinal pigment epithelium        Impression          1 H25.811 Combined forms of age-related cataract of right eye-Stable     2 H25.812 Combined forms of age-related cataract of left eye-Stable     3 H35.342 Lamellar macular hole of left eye-Stable     4 Q14.1 Congenital hypertrophy of retinal pigment epithelium-Stable     5 H35.61 Retinal hemorrhage noted on examination of right eye-Stable                         Discussion      Lamellar macular hole of left eye~H35.342   - new patient referred by Dr. Amaro for cataract preoperative eval    - lamellar hole appears stable, no role for retinal surgery at this time   -  will not preclude patient from cataract surgery      Combined Cataracts OU   - following with Dr. Amaro and scheduled for surgery      Lamellar macular hole of left eye~H35.342   - appears stable, no role for retinal surgery at this time   -  will not preclude patient from cataract surgery      Congenital hypertrophy of retinal pigment epithelium~Q14.1   Stable, Monitor      Retinal hemorrhage noted on examination of right eye~H35.61   Single microanuersym OD      RTC 6 months         She has an micraneurysm OD peripaillar and ERM OS   both cna be observed created by:Bunny Charlton         Negative

## 2024-05-02 NOTE — ED ADULT NURSE NOTE - NS ED NOTE ABUSE RESPONSE YN
SG negative cardiac workup, normal electrolytes, calcium and thyroid, likely deconditioning given long hospital stay. will refer to cardiologist. Yes